# Patient Record
Sex: FEMALE | Race: WHITE
[De-identification: names, ages, dates, MRNs, and addresses within clinical notes are randomized per-mention and may not be internally consistent; named-entity substitution may affect disease eponyms.]

---

## 2021-10-13 ENCOUNTER — HOSPITAL ENCOUNTER (OUTPATIENT)
Dept: HOSPITAL 26 - MDS | Age: 61
Discharge: HOME | End: 2021-10-13
Attending: INTERNAL MEDICINE
Payer: MEDICAID

## 2021-10-13 VITALS — BODY MASS INDEX: 20.81 KG/M2 | WEIGHT: 106 LBS | HEIGHT: 60 IN

## 2021-10-13 DIAGNOSIS — Z12.11: Primary | ICD-10-CM

## 2021-10-13 DIAGNOSIS — I10: ICD-10-CM

## 2021-10-13 DIAGNOSIS — Z79.84: ICD-10-CM

## 2021-10-13 DIAGNOSIS — Z79.899: ICD-10-CM

## 2021-10-13 DIAGNOSIS — E11.9: ICD-10-CM

## 2021-10-13 DIAGNOSIS — Z20.822: ICD-10-CM

## 2021-10-13 PROCEDURE — 45378 DIAGNOSTIC COLONOSCOPY: CPT

## 2021-10-13 PROCEDURE — 87426 SARSCOV CORONAVIRUS AG IA: CPT

## 2021-10-13 PROCEDURE — 82948 REAGENT STRIP/BLOOD GLUCOSE: CPT

## 2021-10-13 RX ADMIN — MIDAZOLAM HYDROCHLORIDE ONE MG: 1 INJECTION INTRAMUSCULAR; INTRAVENOUS at 12:07

## 2021-10-13 RX ADMIN — LIDOCAINE HYDROCHLORIDE ONE MG: 20 JELLY TOPICAL at 12:08

## 2021-10-13 RX ADMIN — FENTANYL CITRATE ONE MG: 50 INJECTION, SOLUTION INTRAMUSCULAR; INTRAVENOUS at 12:06

## 2022-05-24 ENCOUNTER — HOSPITAL ENCOUNTER (OUTPATIENT)
Dept: HOSPITAL 26 - MDS | Age: 62
Discharge: HOME | End: 2022-05-24
Attending: SURGERY
Payer: COMMERCIAL

## 2022-05-24 VITALS — WEIGHT: 103 LBS | BODY MASS INDEX: 20.22 KG/M2 | HEIGHT: 60 IN

## 2022-05-24 VITALS — SYSTOLIC BLOOD PRESSURE: 144 MMHG | DIASTOLIC BLOOD PRESSURE: 71 MMHG

## 2022-05-24 DIAGNOSIS — E11.9: ICD-10-CM

## 2022-05-24 DIAGNOSIS — M06.9: ICD-10-CM

## 2022-05-24 DIAGNOSIS — I10: ICD-10-CM

## 2022-05-24 DIAGNOSIS — K21.9: ICD-10-CM

## 2022-05-24 DIAGNOSIS — K80.10: Primary | ICD-10-CM

## 2022-05-24 DIAGNOSIS — Z20.822: ICD-10-CM

## 2022-05-24 DIAGNOSIS — Z79.899: ICD-10-CM

## 2022-05-24 LAB
ALBUMIN FLD-MCNC: 3.7 G/DL (ref 3.4–5)
ANION GAP SERPL CALCULATED.3IONS-SCNC: 11.2 MMOL/L (ref 8–16)
AST SERPL-CCNC: 17 U/L (ref 15–37)
BASOPHILS # BLD AUTO: 0 K/UL (ref 0–0.22)
BASOPHILS NFR BLD AUTO: 0.5 % (ref 0–2)
BILIRUB SERPL-MCNC: 0.5 MG/DL (ref 0–1)
BUN SERPL-MCNC: 31 MG/DL (ref 7–18)
CHLORIDE SERPL-SCNC: 106 MMOL/L (ref 98–107)
CO2 SERPL-SCNC: 28.5 MMOL/L (ref 21–32)
CREAT SERPL-MCNC: 0.7 MG/DL (ref 0.6–1.3)
EOSINOPHIL # BLD AUTO: 0.1 K/UL (ref 0–0.4)
EOSINOPHIL NFR BLD AUTO: 1.5 % (ref 0–4)
ERYTHROCYTE [DISTWIDTH] IN BLOOD BY AUTOMATED COUNT: 14.3 % (ref 11.6–13.7)
GFR SERPL CREATININE-BSD FRML MDRD: 110 ML/MIN (ref 90–?)
GLUCOSE SERPL-MCNC: 113 MG/DL (ref 74–106)
HCT VFR BLD AUTO: 32.6 % (ref 36–48)
HGB BLD-MCNC: 10.8 G/DL (ref 12–16)
LYMPHOCYTES # BLD AUTO: 2.1 K/UL (ref 2.5–16.5)
LYMPHOCYTES NFR BLD AUTO: 26.8 % (ref 20.5–51.1)
MCH RBC QN AUTO: 28 PG (ref 27–31)
MCHC RBC AUTO-ENTMCNC: 33 G/DL (ref 33–37)
MCV RBC AUTO: 84.9 FL (ref 80–94)
MONOCYTES # BLD AUTO: 0.5 K/UL (ref 0.8–1)
MONOCYTES NFR BLD AUTO: 6.4 % (ref 1.7–9.3)
NEUTROPHILS # BLD AUTO: 5.1 K/UL (ref 1.8–7.7)
NEUTROPHILS NFR BLD AUTO: 64.8 % (ref 42.2–75.2)
PLATELET # BLD AUTO: 323 K/UL (ref 140–450)
POTASSIUM SERPL-SCNC: 4.7 MMOL/L (ref 3.5–5.1)
RBC # BLD AUTO: 3.83 MIL/UL (ref 4.2–5.4)
SODIUM SERPL-SCNC: 141 MMOL/L (ref 136–145)
WBC # BLD AUTO: 7.8 K/UL (ref 4.8–10.8)

## 2022-05-24 PROCEDURE — 86900 BLOOD TYPING SEROLOGIC ABO: CPT

## 2022-05-24 PROCEDURE — 82374 ASSAY BLOOD CARBON DIOXIDE: CPT

## 2022-05-24 PROCEDURE — 36415 COLL VENOUS BLD VENIPUNCTURE: CPT

## 2022-05-24 PROCEDURE — 93005 ELECTROCARDIOGRAM TRACING: CPT

## 2022-05-24 PROCEDURE — 80053 COMPREHEN METABOLIC PANEL: CPT

## 2022-05-24 PROCEDURE — 87081 CULTURE SCREEN ONLY: CPT

## 2022-05-24 PROCEDURE — 86886 COOMBS TEST INDIRECT TITER: CPT

## 2022-05-24 PROCEDURE — 85025 COMPLETE CBC W/AUTO DIFF WBC: CPT

## 2022-05-24 PROCEDURE — 71045 X-RAY EXAM CHEST 1 VIEW: CPT

## 2022-05-24 PROCEDURE — 86901 BLOOD TYPING SEROLOGIC RH(D): CPT

## 2022-05-24 PROCEDURE — 87426 SARSCOV CORONAVIRUS AG IA: CPT

## 2022-05-24 PROCEDURE — 47562 LAPAROSCOPIC CHOLECYSTECTOMY: CPT

## 2022-05-24 NOTE — NUR
PT DISCHARGED HOME WITH FAMILY. IV REMOVED. IV CATHETER INTACT. ALL BELONGINGS TAKEN UPON 
DISCHARGE.

## 2022-05-24 NOTE — NUR
DID ROUNDS ON PT. PT EATING AT THIS TIME. TOLERATING WELL. NO COMPLAINTS OF PAIN OR 
DISCOMFORT. INFORMED PT REGARDING DISCHARGE ORDER. PT AND FAMILY ARE IN AGREEMENT WITH 
DISCHARGE. WILL DISCHARGE ONCE PT IS FINISHED WITH DINNER. WILL CONTINUE TO MONITOR.

## 2022-05-24 NOTE — NUR
PT ARRIVED ONTO UNIT VIA GURNEY FROM OR. ACCOMPANIED BY OR NURSE. PT ADMITTED UNDER DR RUELAS, 
OBSERVATION. PER DR RUELAS, PT IS TO BE OBSERVED FOR A FEW HOURS AND IF NO COMPLICATIONS, MAY 
DISCHARGE HOME LATER TONIGHT. PT IS AWAKE, ALERT, AND COOPERATIVE. RESPIRATIONS ARE EVEN AND 
UNLABORED ON ROOM AIR. LUNG SOUNDS CLEAR ON AUSCULTATION. HR REGULAR. BOWEL SOUNDS PRESENT 
IN ALL QUADRANTS. ABD IS SOFT AND NONTENDER, SKIN IS WARM AND DRY. PT HAS 4 SURGICAL 
INCISION SITES, FROM LAP [employer]YLOR PERFORMED EARLIER TODAY. MRSA SCREEN DONE. VITALS TAKEN. CALL 
LIGHT WITHIN REACH. ALL SAFETY MEASURES IN PLACE. NO COMPLAINTS OF PAIN OR DISCOMFORT. WILL 
CONTINUE TO MONITOR.